# Patient Record
Sex: MALE | Race: WHITE | URBAN - METROPOLITAN AREA
[De-identification: names, ages, dates, MRNs, and addresses within clinical notes are randomized per-mention and may not be internally consistent; named-entity substitution may affect disease eponyms.]

---
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
## 2022-08-11 ENCOUNTER — APPOINTMENT (RX ONLY)
Dept: URBAN - METROPOLITAN AREA CLINIC 151 | Facility: CLINIC | Age: 22
Setting detail: DERMATOLOGY
End: 2022-08-11

## 2022-08-11 DIAGNOSIS — L40.8 OTHER PSORIASIS: ICD-10-CM

## 2022-08-11 PROCEDURE — ? DIAGNOSIS COMMENT

## 2022-08-11 PROCEDURE — ? COUNSELING

## 2022-08-11 PROCEDURE — ? PRESCRIPTION MEDICATION MANAGEMENT

## 2022-08-11 PROCEDURE — 99213 OFFICE O/P EST LOW 20 MIN: CPT

## 2022-08-11 PROCEDURE — ? PRESCRIPTION

## 2022-08-11 RX ORDER — EMOLLIENT COMBINATION NO.43
CREAM (GRAM) TOPICAL BID
Qty: 30 | Refills: 6 | Status: ERX | COMMUNITY
Start: 2022-08-11

## 2022-08-11 RX ORDER — FLUOCINONIDE 0.5 MG/ML
SOLUTION TOPICAL
Qty: 60 | Refills: 5 | Status: ERX | COMMUNITY
Start: 2022-08-11

## 2022-08-11 RX ADMIN — FLUOCINONIDE: 0.5 SOLUTION TOPICAL at 00:00

## 2022-08-11 RX ADMIN — Medication: at 00:00

## 2022-08-11 NOTE — PROCEDURE: PRESCRIPTION MEDICATION MANAGEMENT
Discontinue Regimen: Tacrolimus ointment BID
Initiate Treatment: Promiseb cream BID
Detail Level: Zone
Continue Regimen: Ketoconazole shampoo TIW
Samples Given: Promiseb
Render In Strict Bullet Format?: No

## 2022-08-11 NOTE — PROCEDURE: DIAGNOSIS COMMENT
Render Risk Assessment In Note?: no
Detail Level: Zone
Comment: Pt has been applying ketoconazole shampoo TIW and states he has not been applying tacrolimus ointment for several months now due to greasy texture of the ointment. He notes that he flares on topicals every 2 days. Discussed Promiseb cream BID and fluocinonide solution BID. Pt states that his scalp flares are not as dire as facial flares, thus he will defer treatment at this time with the soln but would like to try Promiseb cream.

## 2022-08-18 RX ORDER — EMOLLIENT COMBINATION NO.43
CREAM (GRAM) TOPICAL BID
Qty: 30 | Refills: 6 | Status: ERX

## 2022-11-22 ENCOUNTER — APPOINTMENT (RX ONLY)
Dept: URBAN - METROPOLITAN AREA CLINIC 151 | Facility: CLINIC | Age: 22
Setting detail: DERMATOLOGY
End: 2022-11-22

## 2022-11-22 ENCOUNTER — RX ONLY (OUTPATIENT)
Age: 22
Setting detail: RX ONLY
End: 2022-11-22

## 2022-11-22 DIAGNOSIS — L71.8 OTHER ROSACEA: ICD-10-CM

## 2022-11-22 DIAGNOSIS — L40.8 OTHER PSORIASIS: ICD-10-CM

## 2022-11-22 PROCEDURE — ? DIAGNOSIS COMMENT

## 2022-11-22 PROCEDURE — 99214 OFFICE O/P EST MOD 30 MIN: CPT

## 2022-11-22 PROCEDURE — ? PRESCRIPTION

## 2022-11-22 PROCEDURE — ? PRESCRIPTION MEDICATION MANAGEMENT

## 2022-11-22 PROCEDURE — ? COUNSELING

## 2022-11-22 RX ORDER — KETOCONAZOLE 20 MG/ML
SHAMPOO, SUSPENSION TOPICAL
Qty: 120 | Refills: 11 | Status: ERX

## 2022-11-22 RX ORDER — SULFACETAMIDE SODIUM, SULFUR 98; 48 MG/G; MG/G
LIQUID TOPICAL
Qty: 285 | Refills: 3 | Status: CANCELLED
Stop reason: CLARIF

## 2022-11-22 ASSESSMENT — LOCATION SIMPLE DESCRIPTION DERM: LOCATION SIMPLE: LEFT CHEEK

## 2022-11-22 ASSESSMENT — LOCATION DETAILED DESCRIPTION DERM: LOCATION DETAILED: LEFT MEDIAL MALAR CHEEK

## 2022-11-22 ASSESSMENT — LOCATION ZONE DERM: LOCATION ZONE: FACE

## 2022-11-22 NOTE — PROCEDURE: DIAGNOSIS COMMENT
Render Risk Assessment In Note?: no
Detail Level: Zone
Comment: Advised Pt to alternate Plexion 9.8 %-4.8 % topical cleanser and ketoconazole shampoo and continue Promiseb cream as needed. Samples of plexion provided, pt to call for rx, will send to [facility].